# Patient Record
Sex: MALE
[De-identification: names, ages, dates, MRNs, and addresses within clinical notes are randomized per-mention and may not be internally consistent; named-entity substitution may affect disease eponyms.]

---

## 2020-05-24 ENCOUNTER — NURSE TRIAGE (OUTPATIENT)
Dept: OTHER | Facility: CLINIC | Age: 40
End: 2020-05-24

## 2021-12-04 ENCOUNTER — NURSE TRIAGE (OUTPATIENT)
Dept: OTHER | Facility: CLINIC | Age: 41
End: 2021-12-04

## 2021-12-05 NOTE — TELEPHONE ENCOUNTER
Brief description of triage:  Vision issues, started with headache and developed a blind spot in the middle then changed to left side. Triage indicates for patient to PCP within 2-3 hours    Care advice provided, patient verbalizes understanding; denies any other questions or concerns; instructed to call back for any new or worsening symptoms. This triage is a result of a call to [address] [place] [address]. Please do not respond to the triage nurse through this encounter. Any subsequent communication should be directly with the patient. Reason for Disposition   [1] Eye pain AND [2] brief (now gone) blurred vision or visual changes    Answer Assessment - Initial Assessment Questions  1. DESCRIPTION: Humphrey Thibodeaux is the vision loss like? Describe it for me. \" (e.g., complete vision loss, blurred vision, double vision, floaters, etc.)        Partial vision loss    2. LOCATION: \"One or both eyes? \" If one, ask: \"Which eye? \"         Both    3. SEVERITY: \"Can you see anything? \" If so, ask: \"What can you see? \" (e.g., fine print)        Able to see now    4. ONSET: \"When did this begin? \" \"Did it start suddenly or has this been gradual?\"        One hour ago, sudden    5. PATTERN: \"Does this come and go, or has it been constant since it started? \"        Come and gone    6. PAIN: \"Is there any pain in your eye(s)? \"  (Scale 1-10; or mild, moderate, severe)        3/10 sinus pressure    7. CONTACTS-GLASSES: \"Do you wear contacts or glasses? \"        Glasses    8. CAUSE: \"What do you think is causing this visual problem? \"        Possibly starting the buspirone    9. OTHER SYMPTOMS: \"Do you have any other symptoms? \" (e.g., confusion, headache, arm or leg weakness, speech problems)        Headache    Protocols used: VISION LOSS OR CHANGE-ADULT-